# Patient Record
Sex: MALE | Race: BLACK OR AFRICAN AMERICAN | NOT HISPANIC OR LATINO | URBAN - NONMETROPOLITAN AREA
[De-identification: names, ages, dates, MRNs, and addresses within clinical notes are randomized per-mention and may not be internally consistent; named-entity substitution may affect disease eponyms.]

---

## 2022-11-18 ENCOUNTER — OFFICE (OUTPATIENT)
Dept: URBAN - NONMETROPOLITAN AREA CLINIC 5 | Facility: CLINIC | Age: 57
End: 2022-11-18

## 2022-11-18 VITALS
HEART RATE: 84 BPM | SYSTOLIC BLOOD PRESSURE: 148 MMHG | DIASTOLIC BLOOD PRESSURE: 91 MMHG | RESPIRATION RATE: 20 BRPM | HEIGHT: 69 IN | WEIGHT: 200 LBS

## 2022-11-18 DIAGNOSIS — R10.11 RIGHT UPPER QUADRANT PAIN: ICD-10-CM

## 2022-11-18 DIAGNOSIS — K21.9 GASTRO-ESOPHAGEAL REFLUX DISEASE WITHOUT ESOPHAGITIS: ICD-10-CM

## 2022-11-18 PROCEDURE — 99203 OFFICE O/P NEW LOW 30 MIN: CPT | Performed by: INTERNAL MEDICINE

## 2022-11-18 NOTE — SERVICENOTES
Will plan to see back in 6 weeks and if with persistent symptoms despite normal EGD and stopping naproxen would plan for cross-sectional imaging with CT abdomen pelvis.

## 2022-11-18 NOTE — SERVICEHPINOTES
Wayne Donovan   is a   56 yo  male   with a past medical history of BPH, GERD, HTN, HLD, and osteoarthritis who presents to establish care for GERD.  Patient reports he has been having reflux with associated cramping pain in his abdomen.  He also reports constipation.  He is on naproxen daily.  He denies any dysphagia, odynophagia, bright blood per rectum, or melena.  He does endorse a 20 lb unintentional weight loss.  He denies previously undergoing an EGD.  He has not had any cross-sectional imaging.  He has never been on ib Kya for the cramping abdominal pain.  He does report urgency with bowel movements.  He last had a colonoscopy in 2019 which he reports was unremarkable.

## 2023-02-17 ENCOUNTER — AMBULATORY SURGICAL CENTER (OUTPATIENT)
Dept: URBAN - NONMETROPOLITAN AREA SURGERY 4 | Facility: SURGERY | Age: 58
End: 2023-02-17

## 2023-02-17 ENCOUNTER — OFFICE (OUTPATIENT)
Dept: URBAN - NONMETROPOLITAN AREA CLINIC 5 | Facility: CLINIC | Age: 58
End: 2023-02-17

## 2023-02-17 VITALS
WEIGHT: 197 LBS | TEMPERATURE: 98 F | RESPIRATION RATE: 12 BRPM | RESPIRATION RATE: 17 BRPM | HEART RATE: 77 BPM | HEIGHT: 69 IN | HEART RATE: 94 BPM | OXYGEN SATURATION: 97 % | DIASTOLIC BLOOD PRESSURE: 92 MMHG | DIASTOLIC BLOOD PRESSURE: 96 MMHG | HEART RATE: 104 BPM | OXYGEN SATURATION: 97 % | HEIGHT: 69 IN | DIASTOLIC BLOOD PRESSURE: 94 MMHG | HEART RATE: 77 BPM | OXYGEN SATURATION: 99 % | DIASTOLIC BLOOD PRESSURE: 94 MMHG | SYSTOLIC BLOOD PRESSURE: 152 MMHG | DIASTOLIC BLOOD PRESSURE: 96 MMHG | RESPIRATION RATE: 17 BRPM | HEART RATE: 95 BPM | DIASTOLIC BLOOD PRESSURE: 91 MMHG | OXYGEN SATURATION: 96 % | WEIGHT: 197 LBS | RESPIRATION RATE: 13 BRPM | SYSTOLIC BLOOD PRESSURE: 144 MMHG | SYSTOLIC BLOOD PRESSURE: 146 MMHG | RESPIRATION RATE: 15 BRPM | TEMPERATURE: 98.1 F | RESPIRATION RATE: 17 BRPM | OXYGEN SATURATION: 97 % | TEMPERATURE: 98.1 F | SYSTOLIC BLOOD PRESSURE: 152 MMHG | RESPIRATION RATE: 13 BRPM | TEMPERATURE: 98.1 F | RESPIRATION RATE: 15 BRPM | DIASTOLIC BLOOD PRESSURE: 98 MMHG | RESPIRATION RATE: 12 BRPM | RESPIRATION RATE: 13 BRPM | OXYGEN SATURATION: 96 % | OXYGEN SATURATION: 99 % | SYSTOLIC BLOOD PRESSURE: 141 MMHG | RESPIRATION RATE: 12 BRPM | SYSTOLIC BLOOD PRESSURE: 144 MMHG | HEART RATE: 95 BPM | DIASTOLIC BLOOD PRESSURE: 98 MMHG | HEART RATE: 98 BPM | RESPIRATION RATE: 15 BRPM | OXYGEN SATURATION: 99 % | DIASTOLIC BLOOD PRESSURE: 96 MMHG | HEART RATE: 86 BPM | DIASTOLIC BLOOD PRESSURE: 92 MMHG | RESPIRATION RATE: 19 BRPM | SYSTOLIC BLOOD PRESSURE: 143 MMHG | DIASTOLIC BLOOD PRESSURE: 97 MMHG | WEIGHT: 197 LBS | DIASTOLIC BLOOD PRESSURE: 97 MMHG | HEART RATE: 86 BPM | SYSTOLIC BLOOD PRESSURE: 146 MMHG | DIASTOLIC BLOOD PRESSURE: 94 MMHG | SYSTOLIC BLOOD PRESSURE: 141 MMHG | SYSTOLIC BLOOD PRESSURE: 143 MMHG | SYSTOLIC BLOOD PRESSURE: 143 MMHG | RESPIRATION RATE: 19 BRPM | HEART RATE: 68 BPM | HEART RATE: 68 BPM | HEART RATE: 95 BPM | HEIGHT: 69 IN | DIASTOLIC BLOOD PRESSURE: 91 MMHG | OXYGEN SATURATION: 98 % | SYSTOLIC BLOOD PRESSURE: 144 MMHG | DIASTOLIC BLOOD PRESSURE: 91 MMHG | HEART RATE: 104 BPM | DIASTOLIC BLOOD PRESSURE: 92 MMHG | RESPIRATION RATE: 19 BRPM | DIASTOLIC BLOOD PRESSURE: 98 MMHG | HEART RATE: 94 BPM | HEART RATE: 86 BPM | SYSTOLIC BLOOD PRESSURE: 141 MMHG | TEMPERATURE: 98 F | HEART RATE: 104 BPM | DIASTOLIC BLOOD PRESSURE: 97 MMHG | HEART RATE: 98 BPM | HEART RATE: 94 BPM | HEART RATE: 98 BPM | OXYGEN SATURATION: 98 % | HEART RATE: 77 BPM | TEMPERATURE: 98 F | HEART RATE: 68 BPM | SYSTOLIC BLOOD PRESSURE: 146 MMHG | OXYGEN SATURATION: 96 % | OXYGEN SATURATION: 98 % | SYSTOLIC BLOOD PRESSURE: 152 MMHG

## 2023-02-17 DIAGNOSIS — Z79.899 OTHER LONG TERM (CURRENT) DRUG THERAPY: ICD-10-CM

## 2023-02-17 DIAGNOSIS — K29.60 OTHER GASTRITIS WITHOUT BLEEDING: ICD-10-CM

## 2023-02-17 DIAGNOSIS — K21.9 GASTRO-ESOPHAGEAL REFLUX DISEASE WITHOUT ESOPHAGITIS: ICD-10-CM

## 2023-02-17 DIAGNOSIS — I10 ESSENTIAL (PRIMARY) HYPERTENSION: ICD-10-CM

## 2023-02-17 DIAGNOSIS — R10.11 RIGHT UPPER QUADRANT PAIN: ICD-10-CM

## 2023-02-17 DIAGNOSIS — B96.81 HELICOBACTER PYLORI [H. PYLORI] AS THE CAUSE OF DISEASES CLA: ICD-10-CM

## 2023-02-17 PROBLEM — K31.89 OTHER DISEASES OF STOMACH AND DUODENUM: Status: ACTIVE | Noted: 2023-02-17

## 2023-02-17 PROCEDURE — 00731 ANES UPR GI NDSC PX NOS: CPT | Mod: QZ,GA,P3 | Performed by: NURSE ANESTHETIST, CERTIFIED REGISTERED

## 2023-02-17 PROCEDURE — 43239 EGD BIOPSY SINGLE/MULTIPLE: CPT | Performed by: INTERNAL MEDICINE

## 2023-02-17 PROCEDURE — 88305 TISSUE EXAM BY PATHOLOGIST: CPT | Performed by: INTERNAL MEDICINE

## 2023-02-17 PROCEDURE — 00731 ANES UPR GI NDSC PX NOS: CPT | Mod: GA,P3,QZ | Performed by: NURSE ANESTHETIST, CERTIFIED REGISTERED

## 2023-02-17 PROCEDURE — 43239 EGD BIOPSY SINGLE/MULTIPLE: CPT | Mod: SG | Performed by: INTERNAL MEDICINE

## 2023-02-17 PROCEDURE — 88313 SPECIAL STAINS GROUP 2: CPT | Performed by: INTERNAL MEDICINE

## 2023-02-17 PROCEDURE — 88342 IMHCHEM/IMCYTCHM 1ST ANTB: CPT | Performed by: INTERNAL MEDICINE

## 2023-04-06 ENCOUNTER — OFFICE (OUTPATIENT)
Dept: URBAN - NONMETROPOLITAN AREA CLINIC 5 | Facility: CLINIC | Age: 58
End: 2023-04-06

## 2023-04-06 VITALS
WEIGHT: 207 LBS | HEART RATE: 86 BPM | DIASTOLIC BLOOD PRESSURE: 92 MMHG | RESPIRATION RATE: 20 BRPM | SYSTOLIC BLOOD PRESSURE: 149 MMHG | HEIGHT: 69 IN

## 2023-04-06 DIAGNOSIS — K21.9 GASTRO-ESOPHAGEAL REFLUX DISEASE WITHOUT ESOPHAGITIS: ICD-10-CM

## 2023-04-06 DIAGNOSIS — B96.81 HELICOBACTER PYLORI [H. PYLORI] AS THE CAUSE OF DISEASES CLA: ICD-10-CM

## 2023-04-06 DIAGNOSIS — R10.84 GENERALIZED ABDOMINAL PAIN: ICD-10-CM

## 2023-04-06 DIAGNOSIS — K62.5 HEMORRHAGE OF ANUS AND RECTUM: ICD-10-CM

## 2023-04-06 PROCEDURE — 99214 OFFICE O/P EST MOD 30 MIN: CPT | Performed by: INTERNAL MEDICINE

## 2023-04-06 NOTE — SERVICENOTES
Given patient's hematochezia and constipation will plan for colonoscopy for further assessment.  Will attempt FD Kya for his infrequent abdominal cramping.  Counseled to hold his pantoprazole for 2 weeks and will obtain H pylori stool antigen to ensure eradication of infection.  He can start back on pantoprazole daily 30-45 minutes prior to breakfast after providing the sample.  Will plan to see back in clinic in 4 months to assess symptoms.  Recommended he continue to follow closely with pulmonology.

## 2023-04-06 NOTE — SERVICEHPINOTES
Wayne Donovan   is a   57   year old  male   with a past medical history of BPH, GERD, HTN, HLD, and osteoarthritis who presents for follow up of GERD.  Patient at initial visit reported he had been having reflux with associated cramping pain in his abdomen.  He also reported constipation.  He was on naproxen daily.  He denied any dysphagia, odynophagia, bright red blood per rectum, or melena.  He did endorse a 20 lb unintentional weight loss.  He denied previously undergoing an EGD.  He has not had any cross-sectional imaging.  He has never been on ib Conor for the cramping abdominal pain.  He did report urgency with bowel movements.  He last had a colonoscopy in 2019 which he reported was unremarkable. Patient underwent an EGD on 02/17/2023 which noted normal mucosa the esophagus, erythema in the antrum compatible with gastritis, normal mucosa in the duodenum. Gastric biopsies were obtained which revealed H pylori gastritis and bismuth based quadruple therapy was prescribed. Given patient's recurrent right upper quadrant pain a CT abdomen pelvis with contrast was ordered. This was completed on 02/24/2023 which noted “there were 7 mm noncalcified nodule laterally in the right lung base with mild thickening of the minor fissure. Lungs with further studies with dedicated CT. There is no finding to explain right upper quadrant abdominal pain. The patient has a small left inguinal hernia the contents of the pelvis unremarkable. ” Patient's subsequent CT chest was completed on 03/06/2023 which showed “there is a 6.3 cm x 2.9 cm x 5.4 cm well-circumscribed lipoma within the posterior lateral chest wall the right. There is a 3.8 mm pleural-based noncalcified pulmonary nodule anterolaterally in the right middle lobe. There was 5.8 mm peripheral pulmonary nodule seen laterally in the right lower lobe. There was a 4.2 mm pleural-based noncalcified pulmonary nodule in the posterior costophrenic angle in the right lung base. There are numerous tiny miliary type nodules seen throughout the right upper lobe and right middle lobe as well as the left lingula in the superior segment of the left lower lobe. ” Patient was subsequently referred to pulmonology who ordered multiple labs to rule out infectious etiology to his symptoms. Patient also underwent bronchoscopy with biopsies and cultures were obtained. Patient was contacted and informed that his Bronch sample showed no evidence of infectious organisms or malignancy. Final cultures would take 4-6 weeks. Schedule up was to be scheduled based on these results. Patient presents for follow-up today for GERD, abdominal pain, and history of H pylori. 
br brPatient presents for follow-up today and reports he has been doing okay.  He still occasionally has right upper quadrant pain which comes on twice weekly and can last 2-3 minutes.  Overall it is not that bothersome to him.  He does have an occasional stomach pains which occur in his mid abdomen and can lasts 10-15 minutes twice daily.  He has not noted any exacerbating or alleviating factors.  He does occasionally see bright red blood per rectum.  He is constipated at baseline.  He has started on Metamucil and is now moving his bowels twice daily.  He did complete the antibiotics for H pylori but has not been tested for eradication.  He has never been on FD conor for his abdominal pain.  He is following closely with pulmonology.  He believes he last had a colonoscopy several years ago.

## 2023-04-27 ENCOUNTER — AMBULATORY SURGICAL CENTER (OUTPATIENT)
Dept: URBAN - NONMETROPOLITAN AREA SURGERY 4 | Facility: SURGERY | Age: 58
End: 2023-04-27

## 2023-04-27 VITALS
HEART RATE: 103 BPM | HEART RATE: 94 BPM | SYSTOLIC BLOOD PRESSURE: 105 MMHG | RESPIRATION RATE: 19 BRPM | SYSTOLIC BLOOD PRESSURE: 135 MMHG | TEMPERATURE: 98 F | DIASTOLIC BLOOD PRESSURE: 84 MMHG | TEMPERATURE: 98.3 F | HEART RATE: 98 BPM | HEART RATE: 91 BPM | OXYGEN SATURATION: 99 % | RESPIRATION RATE: 15 BRPM | DIASTOLIC BLOOD PRESSURE: 84 MMHG | OXYGEN SATURATION: 98 % | HEART RATE: 85 BPM | DIASTOLIC BLOOD PRESSURE: 84 MMHG | HEART RATE: 98 BPM | SYSTOLIC BLOOD PRESSURE: 134 MMHG | RESPIRATION RATE: 14 BRPM | TEMPERATURE: 97.8 F | RESPIRATION RATE: 19 BRPM | RESPIRATION RATE: 14 BRPM | SYSTOLIC BLOOD PRESSURE: 134 MMHG | DIASTOLIC BLOOD PRESSURE: 91 MMHG | HEART RATE: 85 BPM | HEART RATE: 91 BPM | RESPIRATION RATE: 16 BRPM | SYSTOLIC BLOOD PRESSURE: 142 MMHG | DIASTOLIC BLOOD PRESSURE: 83 MMHG | RESPIRATION RATE: 12 BRPM | OXYGEN SATURATION: 99 % | RESPIRATION RATE: 15 BRPM | HEART RATE: 103 BPM | HEART RATE: 83 BPM | DIASTOLIC BLOOD PRESSURE: 89 MMHG | SYSTOLIC BLOOD PRESSURE: 140 MMHG | SYSTOLIC BLOOD PRESSURE: 142 MMHG | WEIGHT: 199 LBS | RESPIRATION RATE: 12 BRPM | SYSTOLIC BLOOD PRESSURE: 128 MMHG | TEMPERATURE: 98.3 F | SYSTOLIC BLOOD PRESSURE: 127 MMHG | HEART RATE: 91 BPM | SYSTOLIC BLOOD PRESSURE: 105 MMHG | RESPIRATION RATE: 16 BRPM | SYSTOLIC BLOOD PRESSURE: 135 MMHG | WEIGHT: 199 LBS | SYSTOLIC BLOOD PRESSURE: 128 MMHG | DIASTOLIC BLOOD PRESSURE: 85 MMHG | DIASTOLIC BLOOD PRESSURE: 85 MMHG | DIASTOLIC BLOOD PRESSURE: 89 MMHG | SYSTOLIC BLOOD PRESSURE: 140 MMHG | DIASTOLIC BLOOD PRESSURE: 91 MMHG | DIASTOLIC BLOOD PRESSURE: 83 MMHG | OXYGEN SATURATION: 99 % | TEMPERATURE: 98.3 F | SYSTOLIC BLOOD PRESSURE: 128 MMHG | OXYGEN SATURATION: 100 % | DIASTOLIC BLOOD PRESSURE: 89 MMHG | HEART RATE: 98 BPM | RESPIRATION RATE: 16 BRPM | DIASTOLIC BLOOD PRESSURE: 82 MMHG | HEART RATE: 83 BPM | DIASTOLIC BLOOD PRESSURE: 78 MMHG | HEART RATE: 92 BPM | HEART RATE: 92 BPM | SYSTOLIC BLOOD PRESSURE: 127 MMHG | SYSTOLIC BLOOD PRESSURE: 105 MMHG | TEMPERATURE: 97.8 F | SYSTOLIC BLOOD PRESSURE: 134 MMHG | HEART RATE: 85 BPM | TEMPERATURE: 98 F | TEMPERATURE: 97.8 F | DIASTOLIC BLOOD PRESSURE: 82 MMHG | DIASTOLIC BLOOD PRESSURE: 85 MMHG | HEART RATE: 94 BPM | HEART RATE: 103 BPM | DIASTOLIC BLOOD PRESSURE: 82 MMHG | WEIGHT: 199 LBS | DIASTOLIC BLOOD PRESSURE: 91 MMHG | OXYGEN SATURATION: 100 % | OXYGEN SATURATION: 100 % | RESPIRATION RATE: 19 BRPM | OXYGEN SATURATION: 98 % | SYSTOLIC BLOOD PRESSURE: 127 MMHG | DIASTOLIC BLOOD PRESSURE: 78 MMHG | SYSTOLIC BLOOD PRESSURE: 135 MMHG | HEIGHT: 69 IN | DIASTOLIC BLOOD PRESSURE: 78 MMHG | TEMPERATURE: 98 F | HEART RATE: 92 BPM | RESPIRATION RATE: 12 BRPM | HEART RATE: 94 BPM | RESPIRATION RATE: 15 BRPM | SYSTOLIC BLOOD PRESSURE: 142 MMHG | DIASTOLIC BLOOD PRESSURE: 83 MMHG | HEIGHT: 69 IN | HEART RATE: 83 BPM | HEIGHT: 69 IN | RESPIRATION RATE: 14 BRPM | OXYGEN SATURATION: 98 % | SYSTOLIC BLOOD PRESSURE: 140 MMHG

## 2023-04-27 DIAGNOSIS — R93.5 ABNORMAL FINDINGS ON DIAGNOSTIC IMAGING OF OTHER ABDOMINAL R: ICD-10-CM

## 2023-04-27 DIAGNOSIS — I10 ESSENTIAL (PRIMARY) HYPERTENSION: ICD-10-CM

## 2023-04-27 DIAGNOSIS — K64.4 RESIDUAL HEMORRHOIDAL SKIN TAGS: ICD-10-CM

## 2023-04-27 DIAGNOSIS — K92.1 MELENA: ICD-10-CM

## 2023-04-27 DIAGNOSIS — Z79.899 OTHER LONG TERM (CURRENT) DRUG THERAPY: ICD-10-CM

## 2023-04-27 PROCEDURE — 45378 DIAGNOSTIC COLONOSCOPY: CPT | Mod: SG | Performed by: INTERNAL MEDICINE

## 2023-04-27 PROCEDURE — 00811 ANES LWR INTST NDSC NOS: CPT | Mod: GA,P3,QZ | Performed by: NURSE ANESTHETIST, CERTIFIED REGISTERED

## 2023-04-27 PROCEDURE — 45378 DIAGNOSTIC COLONOSCOPY: CPT | Performed by: INTERNAL MEDICINE

## 2023-04-27 PROCEDURE — 00811 ANES LWR INTST NDSC NOS: CPT | Mod: P3,QZ,GA | Performed by: NURSE ANESTHETIST, CERTIFIED REGISTERED
